# Patient Record
Sex: MALE | Race: WHITE | NOT HISPANIC OR LATINO | Employment: FULL TIME | URBAN - METROPOLITAN AREA
[De-identification: names, ages, dates, MRNs, and addresses within clinical notes are randomized per-mention and may not be internally consistent; named-entity substitution may affect disease eponyms.]

---

## 2024-01-16 ENCOUNTER — OFFICE VISIT (OUTPATIENT)
Dept: URGENT CARE | Facility: MEDICAL CENTER | Age: 59
End: 2024-01-16
Payer: COMMERCIAL

## 2024-01-16 VITALS
RESPIRATION RATE: 18 BRPM | HEART RATE: 55 BPM | OXYGEN SATURATION: 100 % | DIASTOLIC BLOOD PRESSURE: 80 MMHG | TEMPERATURE: 96.3 F | SYSTOLIC BLOOD PRESSURE: 150 MMHG

## 2024-01-16 DIAGNOSIS — M54.12 CERVICAL RADICULOPATHY: Primary | ICD-10-CM

## 2024-01-16 PROCEDURE — G0382 LEV 3 HOSP TYPE B ED VISIT: HCPCS | Performed by: FAMILY MEDICINE

## 2024-01-16 RX ORDER — TADALAFIL 5 MG/1
TABLET ORAL
COMMUNITY
Start: 2023-12-23

## 2024-01-16 RX ORDER — METOPROLOL SUCCINATE 50 MG/1
50 TABLET, EXTENDED RELEASE ORAL DAILY
COMMUNITY

## 2024-01-16 RX ORDER — DEXTROAMPHETAMINE SACCHARATE, AMPHETAMINE ASPARTATE, DEXTROAMPHETAMINE SULFATE AND AMPHETAMINE SULFATE 5; 5; 5; 5 MG/1; MG/1; MG/1; MG/1
1 TABLET ORAL 2 TIMES DAILY
COMMUNITY

## 2024-01-16 RX ORDER — PREDNISONE 20 MG/1
TABLET ORAL
Qty: 18 TABLET | Refills: 0 | Status: SHIPPED | OUTPATIENT
Start: 2024-01-16

## 2024-01-16 RX ORDER — ROSUVASTATIN CALCIUM 10 MG/1
10 TABLET, COATED ORAL DAILY
COMMUNITY

## 2024-01-16 RX ORDER — LOSARTAN POTASSIUM 50 MG/1
50 TABLET ORAL DAILY
COMMUNITY
Start: 2023-09-25

## 2024-01-16 NOTE — PROGRESS NOTES
[facility]        NAME: Wan Saenz is a 58 y.o. male  : 1965    MRN: [id number]  DATE: 2024  TIME: 5:41 PM    Assessment and Plan   Cervical radiculopathy [M54.12]  1. Cervical radiculopathy  predniSONE 20 mg tablet            Patient Instructions       Follow up with PCP in 3-5 days.  Proceed to  ER if symptoms worsen.    Chief Complaint     Chief Complaint   Patient presents with    Back Pain     Pt states that he has a history of herniated desk. Pt states that about a week ago he had a loss of strength in his L hand and L shoulder pain. Pt states that he is having trouble with his dexterity, like buttoning his buttons. Pt states that baseline he has tingling in both hands.          History of Present Illness       58-year-old male here today with complaint of burning pain in between his shoulder blades and left hand weakness over the past week.  He describes a vague history of neck injury 30 years ago.  At that time it was suggested he undergo neck surgery but he has put it off.  However burning pain is new.  Pain radiates from the neck into the shoulder.  He has noticed he is losing dexterity in his left hand he is right-handed he has difficulty buttoning his shirt.  He is currently taking ibuprofen with no significant improvement.  He is originally from [facility] he has consulted with a neurosurgeon in the past.  Denies any recent heavy lifting or straining.  Symptoms seem worse when he hyperextends his neck.  Presently his pain is 8 out of 10.    Back Pain        Review of Systems   Review of Systems   Musculoskeletal:  Positive for back pain.         Current Medications       Current Outpatient Medications:     amphetamine-dextroamphetamine (ADDERALL) 20 mg tablet, Take 1 tablet by mouth 2 (two) times a day, Disp: , Rfl:     losartan (COZAAR) 50 mg tablet, Take 50 mg by mouth daily, Disp: , Rfl:     metoprolol succinate (TOPROL-XL) 50 mg 24 hr tablet, Take 50 mg by  mouth daily, Disp: , Rfl:     predniSONE 20 mg tablet, 3 tablets once daily day 1 through 3 then 2 tablets daily day 4 through 6 then 1 tablet daily day 7 through 9., Disp: 18 tablet, Rfl: 0    rosuvastatin (CRESTOR) 10 MG tablet, Take 10 mg by mouth daily, Disp: , Rfl:     tadalafil (CIALIS) 5 MG tablet, TAKE 1 TABLET BY MOUTH ONCE DAILY FOR ERECTILE DYSFUNCTION, Disp: , Rfl:     Current Allergies     Allergies as of 01/16/2024 - Reviewed 01/16/2024   Allergen Reaction Noted    Penicillins Rash and Other (See Comments) 02/09/2010            The following portions of the patient's history were reviewed and updated as appropriate: allergies, current medications, past family history, past medical history, past social history, past surgical history and problem list.     Past Medical History:   Diagnosis Date    Hypercholesteremia     Hypertension        History reviewed. No pertinent surgical history.    History reviewed. No pertinent family history.      Medications have been verified.        Objective   /80   Pulse 55   Temp (!) 96.3 °F (35.7 °C) (Tympanic)   Resp 18   SpO2 100%   No LMP for male patient.       Physical Exam     Physical Exam  Vitals and nursing note reviewed.   Constitutional:       Appearance: Normal appearance.   Musculoskeletal:         General: Tenderness present.      Comments: C-spine: Forward flexion 50 degrees extension to 30 degrees lateral rotation and side bending tenderness over the cervical spine C3-C7 and T1 and T2.  Spurling test-positive.  Thoracic spine: Full range of motion.  Tenderness over the mid thoracic spine and right and left paraspinal muscle.  Extremity: Upper-good strength and tone.  However right hand  is 5 out of 5 as compared to left hand  which is 4 out of 5.   Neurological:      Mental Status: He is alert.

## 2024-01-16 NOTE — PATIENT INSTRUCTIONS
Patient neurologic exam within normal limits.  I prescribed prednisone tapering from 60 down to 20 mg over 9 days..  Recommended heating pad to affected shoulder 15 to 30 minutes as needed.  Avoid excessive neck straining.  Also recommend over-the-counter Aleve every 12 hours if needed to be taken with food.  He plans to return to [place] in the next week or 2 and discussed current symptoms with neurosurgeon.    Cervical Radiculopathy   WHAT YOU NEED TO KNOW:   Cervical radiculopathy is a painful condition that happens when a spinal nerve in your neck is pinched or irritated.       DISCHARGE INSTRUCTIONS:   Medicines:  You may need any of the following:  NSAIDs  help decrease swelling and pain. This medicine can be bought without a doctor's order. This medicine can cause stomach bleeding or kidney problems in certain people. If you take blood thinner medicine, always ask your provider if NSAIDs are safe for you. Always read the medicine label and follow the directions on it before using this medicine.    Prescription pain medicine  helps decrease pain. Do not wait until the pain is severe before you take this medicine.    Steroids  help decrease pain and swelling. These may be given as a pill or as an injection in your neck. You may need more than 1 injection if your symptoms do not improve after the first treatment.    Take your medicine as directed.  Contact your healthcare provider if you think your medicine is not helping or if you have side effects. Tell your provider if you are allergic to any medicine. Keep a list of the medicines, vitamins, and herbs you take. Include the amounts, and when and why you take them. Bring the list or the pill bottles to follow-up visits. Carry your medicine list with you in case of an emergency.    Follow up with your healthcare provider or spine specialist as directed:  Write down your questions so you remember to ask them during your visits.  Physical therapy:  Your provider  may suggest physical therapy to stretch and strengthen your muscles. Your physical therapist can teach you how to improve your posture and the way you hold your neck. The therapist may also teach you how to be safely active and avoid more injury. The therapist can also help you develop an exercise program that is safe for your back and neck.  Self-care:   Ice  helps decrease swelling and pain. Ice may also help prevent tissue damage. Use an ice pack, or put crushed ice in a plastic bag. Cover it with a towel and place it on your neck for 15 to 20 minutes every hour or as directed.    Rest  when you feel it is needed. Slowly start to do more each day. Return to your daily activities as directed.    Wear a soft collar.  You may be given a soft collar to support your neck while you sleep. Wear the soft collar only as directed.         Do light stretches and regular exercise.  Your provider may suggest light stretches to help decrease stiffness in your neck and arm as you recover. After your pain is controlled, you may benefit from regular exercise. Ask what type of exercise is safe for your back and neck.    Review your work area.  A comfortable work area can help prevent neck strain. Ask your employer for an ergonomic review to check the position of your desk, chair, phone, and computer. Make any necessary adjustments for your comfort.    Contact your healthcare provider or spine specialist if:   You have a fever.    You are losing weight without trying.    Your pain is worse, even with medicine.    One or both hands feel more numb than before, or you cannot move your fingers well.    You have questions or concerns about your condition or care.    © Copyright [employer] 2023 Information is for End User's use only and may not be sold, redistributed or otherwise used for commercial purposes.  The above information is an  only. It is not intended as medical advice for individual conditions or treatments.  Talk to your doctor, nurse or pharmacist before following any medical regimen to see if it is safe and effective for you.